# Patient Record
(demographics unavailable — no encounter records)

---

## 2024-12-27 NOTE — ASSESSMENT
[FreeTextEntry1] : Mr. Garcia is a 55-year-old man with history of HTN, HLD, asthma, and possible DUSTIN presenting for evaluation of dyspnea on exertion.  Impression: (1) Dyspnea on exertion, exercise stress echo negative for ischemia (2) TAA, 3.7cm ascending aorta (4) HTN, borderline controlled (5) HLD, improved on statin therapy (6) DM2, A1c 6.5% (7) Asthma (8) DUSTIN, pending sleep study  Plan: - Continue losartan-HCTZ 100-25mg and nifedipine 60mg daily. - Continue statin therapy, - Annual TTE to assess ascending aorta; if >4.0cm would refer for CTA chest. - Ongoing pulmonary follow up for management of DUSTIN and asthma.  RTC 6 months, sooner as needed.

## 2024-12-27 NOTE — HISTORY OF PRESENT ILLNESS
[FreeTextEntry1] : Mr. Garcia is a 56-year-old man with history of HTN, HLD, DM2, asthma, and severe DUSTIN presenting for evaluation of dyspnea on exertion.  Patient notes that over the past few months he has been having worsening dyspnea on exertion, noted while climbing stairs at work. He saw pulmonology and was started on inhalers and reports moderate improvement but still does not feel quite back to normal. He denies chest pain, palpitations, pre-syncope, syncope, LE swelling, PND, or orthopnea.  ECG shows NSR with non-specific ST and T wave abnormality.  He underwent repeat exercise stress echo 12/2022 negative for ischemia as below.  Exercise Stress Echo 12/2022 - Negative for ischemia. - Below-average exercise tolerance (METS 6.3) - Mild dilatation of the ascending Ao of 3.7cm  TTE 12/2023 - Normal biventricular function, AscAo 3.7cm (normal when indexed), no significant valvular disease  Labs: - Cr 1.22, K 4.2, AST 40, ALT 44 - A1c 6.3%, TSH 1.44 - , HDL 35, , LDL 59  Meds: - Losartan-HCTZ 100-25mg - Nifedipine 60mg - Rosuvastatin 10mg - Metformin 500mg